# Patient Record
Sex: MALE | Race: WHITE
[De-identification: names, ages, dates, MRNs, and addresses within clinical notes are randomized per-mention and may not be internally consistent; named-entity substitution may affect disease eponyms.]

---

## 2020-01-07 ENCOUNTER — HOSPITAL ENCOUNTER (OUTPATIENT)
Dept: HOSPITAL 92 - DTY/OP | Age: 60
Discharge: HOME | End: 2020-01-07
Attending: SPECIALIST
Payer: COMMERCIAL

## 2020-01-07 DIAGNOSIS — Z01.818: Primary | ICD-10-CM

## 2020-01-07 DIAGNOSIS — E66.01: ICD-10-CM

## 2020-01-07 PROCEDURE — 97802 MEDICAL NUTRITION INDIV IN: CPT

## 2020-01-24 ENCOUNTER — HOSPITAL ENCOUNTER (OUTPATIENT)
Dept: HOSPITAL 92 - LABBT | Age: 60
Discharge: HOME | End: 2020-01-24
Attending: SPECIALIST
Payer: COMMERCIAL

## 2020-01-24 ENCOUNTER — HOSPITAL ENCOUNTER (INPATIENT)
Dept: HOSPITAL 92 - SURG A | Age: 60
LOS: 7 days | Discharge: HOME | DRG: 621 | End: 2020-01-31
Attending: SPECIALIST | Admitting: SPECIALIST
Payer: COMMERCIAL

## 2020-01-24 VITALS — BODY MASS INDEX: 41.6 KG/M2

## 2020-01-24 DIAGNOSIS — F17.200: ICD-10-CM

## 2020-01-24 DIAGNOSIS — I73.9: ICD-10-CM

## 2020-01-24 DIAGNOSIS — E66.01: Primary | ICD-10-CM

## 2020-01-24 DIAGNOSIS — Z01.810: Primary | ICD-10-CM

## 2020-01-24 DIAGNOSIS — M10.9: ICD-10-CM

## 2020-01-24 DIAGNOSIS — Z79.899: ICD-10-CM

## 2020-01-24 DIAGNOSIS — I10: ICD-10-CM

## 2020-01-24 DIAGNOSIS — E66.01: ICD-10-CM

## 2020-01-24 PROCEDURE — 36415 COLL VENOUS BLD VENIPUNCTURE: CPT

## 2020-01-24 PROCEDURE — C9113 INJ PANTOPRAZOLE SODIUM, VIA: HCPCS

## 2020-01-24 PROCEDURE — 80048 BASIC METABOLIC PNL TOTAL CA: CPT

## 2020-01-24 PROCEDURE — S0020 INJECTION, BUPIVICAINE HYDRO: HCPCS

## 2020-01-24 PROCEDURE — 93005 ELECTROCARDIOGRAM TRACING: CPT

## 2020-01-24 PROCEDURE — 85025 COMPLETE CBC W/AUTO DIFF WBC: CPT

## 2020-01-24 PROCEDURE — 88307 TISSUE EXAM BY PATHOLOGIST: CPT

## 2020-01-24 PROCEDURE — 88312 SPECIAL STAINS GROUP 1: CPT

## 2020-01-24 PROCEDURE — 93010 ELECTROCARDIOGRAM REPORT: CPT

## 2020-01-27 NOTE — EKG
Test Reason : 

Blood Pressure : ***/*** mmHG

Vent. Rate : 056 BPM     Atrial Rate : 056 BPM

   P-R Int : 174 ms          QRS Dur : 096 ms

    QT Int : 448 ms       P-R-T Axes : 042 038 021 degrees

   QTc Int : 432 ms

 

Sinus bradycardia

Incomplete right bundle branch block

Cannot rule out Anterior infarct , age undetermined

Low voltage QRS

Abnormal ECG

No previous ECGs available

Confirmed by GRACIELA DENNIS, DR. GRANDE ([id number]) on 1/27/2020 6:57:56 PM

 

Referred By:  [facility]           Confirmed By:DR. CHRISTIANE OWENS MD

## 2020-01-30 PROCEDURE — 0DB64Z3 EXCISION OF STOMACH, PERCUTANEOUS ENDOSCOPIC APPROACH, VERTICAL: ICD-10-PCS | Performed by: SPECIALIST

## 2020-01-30 RX ADMIN — POTASSIUM CHLORIDE, DEXTROSE MONOHYDRATE AND SODIUM CHLORIDE SCH MLS: 150; 5; 450 INJECTION, SOLUTION INTRAVENOUS at 15:10

## 2020-01-30 RX ADMIN — POTASSIUM CHLORIDE, DEXTROSE MONOHYDRATE AND SODIUM CHLORIDE SCH MLS: 150; 5; 450 INJECTION, SOLUTION INTRAVENOUS at 21:21

## 2020-01-31 VITALS — TEMPERATURE: 98.1 F | DIASTOLIC BLOOD PRESSURE: 84 MMHG | SYSTOLIC BLOOD PRESSURE: 149 MMHG

## 2020-01-31 LAB
ANION GAP SERPL CALC-SCNC: 11 MMOL/L (ref 10–20)
BASOPHILS # BLD AUTO: 0 THOU/UL (ref 0–0.2)
BASOPHILS NFR BLD AUTO: 0.1 % (ref 0–1)
BUN SERPL-MCNC: 8 MG/DL (ref 8.4–25.7)
CALCIUM SERPL-MCNC: 8.7 MG/DL (ref 7.8–10.44)
CHLORIDE SERPL-SCNC: 105 MMOL/L (ref 98–107)
CO2 SERPL-SCNC: 26 MMOL/L (ref 22–29)
CREAT CL PREDICTED SERPL C-G-VRATE: 160 ML/MIN (ref 70–130)
EOSINOPHIL # BLD AUTO: 0 THOU/UL (ref 0–0.7)
EOSINOPHIL NFR BLD AUTO: 0.2 % (ref 0–10)
GLUCOSE SERPL-MCNC: 130 MG/DL (ref 70–105)
HGB BLD-MCNC: 13.8 G/DL (ref 14–18)
LYMPHOCYTES # BLD: 0.9 THOU/UL (ref 1.2–3.4)
LYMPHOCYTES NFR BLD AUTO: 9.3 % (ref 21–51)
MCH RBC QN AUTO: 30.6 PG (ref 27–31)
MCV RBC AUTO: 92.6 FL (ref 78–98)
MONOCYTES # BLD AUTO: 0.9 THOU/UL (ref 0.11–0.59)
MONOCYTES NFR BLD AUTO: 9.1 % (ref 0–10)
NEUTROPHILS # BLD AUTO: 8 THOU/UL (ref 1.4–6.5)
NEUTROPHILS NFR BLD AUTO: 81.4 % (ref 42–75)
PLATELET # BLD AUTO: 246 THOU/UL (ref 130–400)
POTASSIUM SERPL-SCNC: 4.2 MMOL/L (ref 3.5–5.1)
RBC # BLD AUTO: 4.53 MILL/UL (ref 4.7–6.1)
SODIUM SERPL-SCNC: 138 MMOL/L (ref 136–145)
WBC # BLD AUTO: 9.8 THOU/UL (ref 4.8–10.8)

## 2020-01-31 RX ADMIN — POTASSIUM CHLORIDE, DEXTROSE MONOHYDRATE AND SODIUM CHLORIDE SCH: 150; 5; 450 INJECTION, SOLUTION INTRAVENOUS at 05:44

## 2020-01-31 NOTE — OP
DATE OF PROCEDURE:  01/30/2020



PREOPERATIVE DIAGNOSIS:  Morbid obesity.



POSTOPERATIVE DIAGNOSIS:  Morbid obesity.



PROCEDURE PERFORMED:  Laparoscopic vertical sleeve gastrectomy using the ViSiGi

device. 



ANESTHESIA:  General endotracheal.



INDICATIONS:  The patient is a 59-year-old white male.  He presents with recent

morbid obesity and has undergone extensive evaluation and education, presents at

this time for sleeve gastrectomy. 



DESCRIPTION OF OPERATION:  Informed consent was obtained.  The patient was taken to

the operating room where general endotracheal anesthesia was obtained with the

patient in supine position.  Abdomen was prepped with ChloraPrep and draped in

sterile fashion.  Local anesthetic was infiltrated and 5 mm supraumbilical incision

was created through which Veress needle was passed to the peritoneal cavity and

pneumoperitoneum established using carbon dioxide up to a pressure of 15 mmHg.  A 5

mm trocar port was passed through this same incision.  Laparoscopic camera was

passed through this port.  Under direct vision, 4 additional ports were placed

including bilateral 5 mm subcostal ports, a 12 mm right paramedian port and a 15 mm

left paramedian port. 



A 5 mm epigastric incision was created through which Na retractor was passed

into the abdominal cavity and used to retract the left lobe of the liver.  The

patient was placed into reverse Trendelenburg position. 



The ViSiGi device was advanced within the stomach and used to decompress this.  The

pylorus was identified and beginning 4 cm proximal to the pylorus, the omentum and

vascular tissue along the greater curvature was divided using the LigaSure in an

ascending fashion up to the angle of His.  All posterior adhesions were mobilized.

The short gastric vessels were carefully divided and then hemostasis was maintained

using the LigaSure.  Once this was completely mobilized, the ViSiGi was carefully

positioned at the level of the pylorus and placed to suction, which was clearly

defining the lesser curvature of the stomach. 



The gastrectomy was then performed using a series of fires of the [place] stapler

using a green load followed by a gold load and a series of blue loads until

completion of the gastrectomy.  The ViSiGi along the lesser curvature was used as a

size 36 bougie to guide in the gastric division.  Care was taken to avoid narrowing

the incisura or the gastroesophageal junction. 



The integrity of the staple line was then assessed by insufflating gas through the

ViSiGi while irrigating along the staple line.  There was no evidence of an air

leak.  There was no evidence of bleeding along the staple line. 



The resected stomach was then removed through the 15 mm port and the fascia was

closed with 0 Vicryl suture using a GraNee needle.  I then closed the 12 mm port

also using the GraNee needle and 0 Vicryl suture.  The Na retractor was

removed.  All ports and instruments were removed under direct vision.  All irrigant

was aspirated.  Pneumoperitoneum was carefully evacuated.  0.25% Marcaine with

epinephrine was infiltrated into each port site.  Skin edges approximated with 4-0

Monocryl subcuticular suture.  Dermabond was placed externally.  There were no

complications.  The patient tolerated the procedure well and was taken to recovery

room in stable condition. 



FINDINGS:  There were no intraabdominal adhesions.  The liver had decompressed

nicely with his preoperative diet and no longer had fatty changes.  The surgery was

performed safely with essentially no blood loss.  The patient tolerated the

procedure well and was taken to recovery room in stable condition. 







Job ID:  [id number]

## 2020-02-04 ENCOUNTER — HOSPITAL ENCOUNTER (OUTPATIENT)
Dept: HOSPITAL 92 - ONC/OP | Age: 60
Discharge: HOME | End: 2020-02-04
Attending: SPECIALIST
Payer: COMMERCIAL

## 2020-02-04 VITALS — DIASTOLIC BLOOD PRESSURE: 69 MMHG | SYSTOLIC BLOOD PRESSURE: 120 MMHG | TEMPERATURE: 97.6 F

## 2020-02-04 DIAGNOSIS — E86.0: Primary | ICD-10-CM

## 2020-02-04 LAB
ANION GAP SERPL CALC-SCNC: 11 MMOL/L (ref 10–20)
BUN SERPL-MCNC: 18 MG/DL (ref 8.4–25.7)
CALCIUM SERPL-MCNC: 9.5 MG/DL (ref 7.8–10.44)
CHLORIDE SERPL-SCNC: 105 MMOL/L (ref 98–107)
CO2 SERPL-SCNC: 30 MMOL/L (ref 22–29)
CREAT CL PREDICTED SERPL C-G-VRATE: 0 ML/MIN (ref 70–130)
GLUCOSE SERPL-MCNC: 98 MG/DL (ref 70–105)
POTASSIUM SERPL-SCNC: 3.8 MMOL/L (ref 3.5–5.1)
SODIUM SERPL-SCNC: 142 MMOL/L (ref 136–145)

## 2020-02-04 PROCEDURE — 96361 HYDRATE IV INFUSION ADD-ON: CPT

## 2020-02-04 PROCEDURE — 80048 BASIC METABOLIC PNL TOTAL CA: CPT

## 2020-02-04 PROCEDURE — 96365 THER/PROPH/DIAG IV INF INIT: CPT

## 2020-02-04 PROCEDURE — 96366 THER/PROPH/DIAG IV INF ADDON: CPT

## 2020-02-04 PROCEDURE — 36415 COLL VENOUS BLD VENIPUNCTURE: CPT
